# Patient Record
Sex: MALE | Race: WHITE | HISPANIC OR LATINO | Employment: FULL TIME | URBAN - METROPOLITAN AREA
[De-identification: names, ages, dates, MRNs, and addresses within clinical notes are randomized per-mention and may not be internally consistent; named-entity substitution may affect disease eponyms.]

---

## 2017-10-27 ENCOUNTER — HISTORICAL (OUTPATIENT)
Dept: SURGERY | Facility: HOSPITAL | Age: 37
End: 2017-10-27

## 2018-04-11 ENCOUNTER — HISTORICAL (OUTPATIENT)
Dept: LAB | Facility: HOSPITAL | Age: 38
End: 2018-04-11

## 2018-04-11 ENCOUNTER — HISTORICAL (OUTPATIENT)
Dept: PREADMISSION TESTING | Facility: HOSPITAL | Age: 38
End: 2018-04-11

## 2018-04-11 LAB
ABS NEUT (OLG): 3.69 X10(3)/MCL (ref 2.1–9.2)
ALBUMIN SERPL-MCNC: 4.3 GM/DL (ref 3.4–5)
ALBUMIN/GLOB SERPL: 1.4 RATIO (ref 1.1–2)
ALP SERPL-CCNC: 67 UNIT/L (ref 50–136)
ALT SERPL-CCNC: 57 UNIT/L (ref 12–78)
APTT PPP: 29.8 SECOND(S) (ref 24.8–36.9)
AST SERPL-CCNC: 29 UNIT/L (ref 15–37)
BASOPHILS # BLD AUTO: 0.1 X10(3)/MCL (ref 0–0.2)
BASOPHILS NFR BLD AUTO: 1 %
BILIRUB SERPL-MCNC: 0.8 MG/DL (ref 0.2–1)
BILIRUBIN DIRECT+TOT PNL SERPL-MCNC: 0.1 MG/DL (ref 0–0.5)
BILIRUBIN DIRECT+TOT PNL SERPL-MCNC: 0.7 MG/DL (ref 0–0.8)
BUN SERPL-MCNC: 16 MG/DL (ref 7–18)
CALCIUM SERPL-MCNC: 9.9 MG/DL (ref 8.5–10.1)
CHLORIDE SERPL-SCNC: 107 MMOL/L (ref 98–107)
CO2 SERPL-SCNC: 27 MMOL/L (ref 21–32)
CREAT SERPL-MCNC: 0.9 MG/DL (ref 0.7–1.3)
EOSINOPHIL # BLD AUTO: 0.2 X10(3)/MCL (ref 0–0.9)
EOSINOPHIL NFR BLD AUTO: 3 %
ERYTHROCYTE [DISTWIDTH] IN BLOOD BY AUTOMATED COUNT: 12.8 % (ref 11.5–17)
GLOBULIN SER-MCNC: 3.1 GM/DL (ref 2.4–3.5)
GLUCOSE SERPL-MCNC: 94 MG/DL (ref 74–106)
HCT VFR BLD AUTO: 47.1 % (ref 42–52)
HGB BLD-MCNC: 15.6 GM/DL (ref 14–18)
INR PPP: 0.99 (ref 0–1.27)
LYMPHOCYTES # BLD AUTO: 1.4 X10(3)/MCL (ref 0.6–4.6)
LYMPHOCYTES NFR BLD AUTO: 25 %
MCH RBC QN AUTO: 30 PG (ref 27–31)
MCHC RBC AUTO-ENTMCNC: 33.1 GM/DL (ref 33–36)
MCV RBC AUTO: 90.6 FL (ref 80–94)
MONOCYTES # BLD AUTO: 0.4 X10(3)/MCL (ref 0.1–1.3)
MONOCYTES NFR BLD AUTO: 6 %
NEUTROPHILS # BLD AUTO: 3.69 X10(3)/MCL (ref 1.4–7.9)
NEUTROPHILS NFR BLD AUTO: 63 %
PLATELET # BLD AUTO: 251 X10(3)/MCL (ref 130–400)
PMV BLD AUTO: 11.4 FL (ref 9.4–12.4)
POTASSIUM SERPL-SCNC: 5 MMOL/L (ref 3.5–5.1)
PROT SERPL-MCNC: 7.4 GM/DL (ref 6.4–8.2)
PROTHROMBIN TIME: 13.4 SECOND(S) (ref 12.2–14.7)
RBC # BLD AUTO: 5.2 X10(6)/MCL (ref 4.7–6.1)
SODIUM SERPL-SCNC: 140 MMOL/L (ref 136–145)
WBC # SPEC AUTO: 5.8 X10(3)/MCL (ref 4.5–11.5)

## 2018-04-20 ENCOUNTER — HISTORICAL (OUTPATIENT)
Dept: SURGERY | Facility: HOSPITAL | Age: 38
End: 2018-04-20

## 2018-04-30 ENCOUNTER — HISTORICAL (OUTPATIENT)
Dept: ADMINISTRATIVE | Facility: HOSPITAL | Age: 38
End: 2018-04-30

## 2018-04-30 LAB
ABS NEUT (OLG): 3.8
ALBUMIN SERPL-MCNC: 4.3 GM/DL (ref 3.4–5)
ALBUMIN/GLOB SERPL: 2.05 {RATIO} (ref 1.5–2.5)
ALP SERPL-CCNC: 60 UNIT/L (ref 38–126)
ALT SERPL-CCNC: 33 UNIT/L (ref 7–52)
AST SERPL-CCNC: 21 UNIT/L (ref 15–37)
BILIRUB SERPL-MCNC: 0.5 MG/DL (ref 0.2–1)
BILIRUBIN DIRECT+TOT PNL SERPL-MCNC: 0.1 MG/DL (ref 0–0.5)
BUN SERPL-MCNC: 12 MG/DL (ref 7–18)
CALCIUM SERPL-MCNC: 9.1 MG/DL (ref 8.5–10)
CHLORIDE SERPL-SCNC: 106 MMOL/L (ref 98–107)
CHOLEST SERPL-MCNC: 243 MG/DL (ref 0–200)
CHOLEST/HDLC SERPL: 6.4 {RATIO}
CO2 SERPL-SCNC: 26 MMOL/L (ref 21–32)
CREAT SERPL-MCNC: 0.88 MG/DL (ref 0.6–1.3)
CREAT/UREA NIT SERPL: 13.6
ERYTHROCYTE [DISTWIDTH] IN BLOOD BY AUTOMATED COUNT: 13.1 % (ref 11.5–17)
GGT SERPL-CCNC: 30 UNIT/L (ref 5–85)
GLOBULIN SER-MCNC: 2.1 GM/DL (ref 1.2–3)
GLUCOSE SERPL-MCNC: 104 MG/DL (ref 74–106)
HCT VFR BLD AUTO: 43.9 % (ref 42–52)
HDLC SERPL-MCNC: 38 MG/DL (ref 35–60)
HGB BLD-MCNC: 14.7 GM/DL (ref 14–18)
LDH SERPL-CCNC: 155 UNIT/L (ref 140–271)
LDLC SERPL CALC-MCNC: 162 MG/DL (ref 0–129)
LYMPHOCYTES # BLD AUTO: 1.7 X10(3)/MCL (ref 0.6–3.4)
LYMPHOCYTES NFR BLD AUTO: 28.1 % (ref 13–40)
MCH RBC QN AUTO: 31 PG (ref 27–31.2)
MCHC RBC AUTO-ENTMCNC: 34 GM/DL (ref 32–36)
MCV RBC AUTO: 93 FL (ref 80–94)
MONOCYTES # BLD AUTO: 0.4 X10(3)/MCL (ref 0–1.8)
MONOCYTES NFR BLD AUTO: 7.4 % (ref 0.1–24)
NEUTROPHILS NFR BLD AUTO: 64.5 % (ref 47–80)
PLATELET # BLD AUTO: 281 X10(3)/MCL (ref 130–400)
PMV BLD AUTO: 10.1 FL
POTASSIUM SERPL-SCNC: 4.3 MMOL/L (ref 3.5–5.1)
PROT SERPL-MCNC: 6.4 GM/DL (ref 6.4–8.2)
PSA SERPL-MCNC: 0.73 NG/ML (ref 0–3.5)
RBC # BLD AUTO: 4.74 X10(6)/MCL (ref 4.7–6.1)
SODIUM SERPL-SCNC: 139 MMOL/L (ref 136–145)
TRIGL SERPL-MCNC: 176 MG/DL (ref 30–150)
VLDLC SERPL CALC-MCNC: 35.2 MG/DL
WBC # SPEC AUTO: 5.9 X10(3)/MCL (ref 4.5–11.5)

## 2020-02-20 ENCOUNTER — HISTORICAL (OUTPATIENT)
Dept: ADMINISTRATIVE | Facility: HOSPITAL | Age: 40
End: 2020-02-20

## 2020-02-20 ENCOUNTER — HISTORICAL (OUTPATIENT)
Dept: LAB | Facility: HOSPITAL | Age: 40
End: 2020-02-20

## 2020-02-20 LAB
ABS NEUT (OLG): 4 X10(3)/MCL (ref 2.1–9.2)
ALBUMIN SERPL-MCNC: 5.2 GM/DL (ref 3.4–5)
ALBUMIN/GLOB SERPL: 2.17 {RATIO} (ref 1.5–2.5)
ALP SERPL-CCNC: 61 UNIT/L (ref 38–126)
ALT SERPL-CCNC: 31 UNIT/L (ref 7–52)
AST SERPL-CCNC: 28 UNIT/L (ref 15–37)
BILIRUB SERPL-MCNC: 1.4 MG/DL (ref 0.2–1)
BILIRUBIN DIRECT+TOT PNL SERPL-MCNC: 0.2 MG/DL (ref 0–0.5)
BILIRUBIN DIRECT+TOT PNL SERPL-MCNC: 1.2 MG/DL
BUN SERPL-MCNC: 17 MG/DL (ref 7–18)
C DIFF INTERP: NEGATIVE
CALCIUM SERPL-MCNC: 10.1 MG/DL (ref 8.5–10)
CHLORIDE SERPL-SCNC: 101 MMOL/L (ref 98–107)
CO2 SERPL-SCNC: 27 MMOL/L (ref 21–32)
CREAT SERPL-MCNC: 1.01 MG/DL (ref 0.6–1.3)
ERYTHROCYTE [DISTWIDTH] IN BLOOD BY AUTOMATED COUNT: 12.4 % (ref 11.5–17)
FLUAV AG NPH QL IA: NEGATIVE
FLUBV AG NPH QL IA: NEGATIVE
GLOBULIN SER-MCNC: 2.4 GM/DL (ref 1.2–3)
GLUCOSE SERPL-MCNC: 112 MG/DL (ref 74–106)
HCT VFR BLD AUTO: 50.6 % (ref 42–52)
HGB BLD-MCNC: 16.8 GM/DL (ref 14–18)
LYMPHOCYTES # BLD AUTO: 1.1 X10(3)/MCL (ref 0.6–3.4)
LYMPHOCYTES NFR BLD AUTO: 18.5 % (ref 13–40)
MCH RBC QN AUTO: 29.2 PG (ref 27–31.2)
MCHC RBC AUTO-ENTMCNC: 33 GM/DL (ref 32–36)
MCV RBC AUTO: 88 FL (ref 80–94)
MONOCYTES # BLD AUTO: 0.9 X10(3)/MCL (ref 0.1–1.3)
MONOCYTES NFR BLD AUTO: 15.3 % (ref 0.1–24)
NEUTROPHILS NFR BLD AUTO: 66.2 % (ref 47–80)
PLATELET # BLD AUTO: 245 X10(3)/MCL (ref 130–400)
PMV BLD AUTO: 10.6 FL (ref 9.4–12.4)
POTASSIUM SERPL-SCNC: 6.4 MMOL/L (ref 3.5–5.1)
PROT SERPL-MCNC: 7.6 GM/DL (ref 6.4–8.2)
RBC # BLD AUTO: 5.76 X10(6)/MCL (ref 4.7–6.1)
SODIUM SERPL-SCNC: 137 MMOL/L (ref 136–145)
WBC # SPEC AUTO: 6 X10(3)/MCL (ref 4.5–11.5)

## 2020-02-21 ENCOUNTER — HISTORICAL (OUTPATIENT)
Dept: ADMINISTRATIVE | Facility: HOSPITAL | Age: 40
End: 2020-02-21

## 2020-02-21 LAB
BUN SERPL-MCNC: 19 MG/DL (ref 7–18)
CALCIUM SERPL-MCNC: 9.9 MG/DL (ref 8.5–10)
CHLORIDE SERPL-SCNC: 100 MMOL/L (ref 98–107)
CO2 SERPL-SCNC: 26 MMOL/L (ref 21–32)
CREAT SERPL-MCNC: 0.99 MG/DL (ref 0.6–1.3)
CREAT/UREA NIT SERPL: 19.2
GLUCOSE SERPL-MCNC: 163 MG/DL (ref 74–106)
POTASSIUM SERPL-SCNC: 4.1 MMOL/L (ref 3.5–5.1)
SODIUM SERPL-SCNC: 136 MMOL/L (ref 136–145)

## 2020-02-22 LAB — FINAL CULTURE: NORMAL

## 2021-11-19 ENCOUNTER — HISTORICAL (OUTPATIENT)
Dept: ADMINISTRATIVE | Facility: HOSPITAL | Age: 41
End: 2021-11-19

## 2022-04-10 ENCOUNTER — HISTORICAL (OUTPATIENT)
Dept: ADMINISTRATIVE | Facility: HOSPITAL | Age: 42
End: 2022-04-10

## 2022-04-30 VITALS
WEIGHT: 224 LBS | SYSTOLIC BLOOD PRESSURE: 122 MMHG | HEIGHT: 74 IN | BODY MASS INDEX: 29 KG/M2 | BODY MASS INDEX: 28.75 KG/M2 | DIASTOLIC BLOOD PRESSURE: 80 MMHG | HEIGHT: 74 IN | DIASTOLIC BLOOD PRESSURE: 78 MMHG | WEIGHT: 226 LBS | SYSTOLIC BLOOD PRESSURE: 123 MMHG

## 2022-04-30 NOTE — OP NOTE
DATE OF SURGERY:    10/27/2017    SURGEON:  Gelacio Mobley MD    PREOPERATIVE DIAGNOSIS:  Bleeding internal hemorrhoids.    POSTOPERATIVE DIAGNOSIS:  Bleeding internal hemorrhoids.    PROCEDURE:  Excisional hemorrhoidectomy times two.    ANESTHESIA:  Regional.    ESTIMATED BLOOD LOSS:  30 ml.    SPECIMENS:  Hemorrhoids.    COMPLICATIONS:  None.    PROCEDURE IN DETAIL:  After informed consent was obtained, the patient was brought to the Operating Room.  A member of the anesthesia team placed a spinal without complication.  The patient was then placed in prone jackknife position.  Buttocks were taped apart for exposure.  The perianal skin was prepped and draped in sterile surgical fashion.  A medium Hill-Cristina retractor was inserted and all four quadrants inspected.  The patient had enlarged left lateral and right posterolateral hemorrhoidal columns.  Left hemorrhoidal column was excised first.  Marcaine 0.25% with epinephrine was used for local anesthesia.  The anoderm was incised sharply with a #15 blade.  Hemorrhoidal column was carefully dissected off of the underlying sphincter complex with Metzenbaum scissors.  Excision was completed with electrocautery.  Hemostasis was achieved with spot cautery.  Anoderm was reapproximated with running locking 2-0 chromic suture.  Next, the right posterolateral hemorrhoidal column was excised similarly.  Upon completion, both suture lines were hemostatic.  No other pathology was identified.  A hydrocortisone suppository was placed within the rectum.  Recticare cream was applied externally, covered with fluff     gauze and secured with tape.  The patient tolerated the procedure well.  There were no complications.  He was returned to the supine position then subsequently transferred to recovery in satisfactory condition.        ______________________________  MD CAMILLE Jones/CIELO  DD:  10/27/2017  Time:  01:36PM  DT:  10/29/2017  Time:  01:00PM  Job #:   [id number]    cc: Jarrett Bray MD

## 2022-04-30 NOTE — OP NOTE
DATE OF SURGERY:    04/20/2018    SURGEON:  Jose Rodriguez MD    PREOPERATIVE DIAGNOSIS:  Nasal obstruction; deviated nasal septum; hypertrophy bilateral inferior turbinates.    PROCEDURE:  Septoplasty; submucosal resection bilateral inferior turbinates.    ESTIMATED BLOOD LOSS:  Normal.    INDICATIONS FOR PROCEDURE:  This is a pleasant male with a longstanding history of nasal obstruction refractory to nasal steroids and conservative management.    PROCEDURE IN DETAIL:  Septoplasty was performed by making a hemitransfixion incision on the patient's left side. A mucoperichondrial flap was then elevated. The deviated components of the nasal septum were removed, leaving 1.5 cm dorsal and caudal struts to aid in the support. The hemitransfixion incision was then closed with a chromic suture and then reapproximated with a running 5-0 plain gut suture.    Bilateral submucosal resection of the inferior turbinates was completed by addressing the inferior turbinates with a shaver microdebrider, removing the submucosal tissue and then outfractured laterally with a Bautista elevator.    NasoPore was placed in the bilateral middle meatus with hemostasis. The patient was then turned over to Anesthesia for extubation.  Patient tolerated the procedure well without complication.            ______________________________  MD JERRI Lee/AGUSTO  DD:  05/02/2018  Time:  05:04PM  DT:  05/03/2018  Time:  07:23AM  Job #:  [id number]

## 2022-05-02 NOTE — HISTORICAL [facility] CERNER
This is a historical note converted from Larry. Formatting and pictures may have been removed.  Please reference Larry for original formatting and attached multimedia. Chief Complaint  R foot ?pain/ eval mole on back  History of Present Illness  The patient is a 41-year-old white male who?reports?trauma to the right heel?several weeks ago.? He?complains of continued dull ache?3-4 out of 10 when walking.? No paresthesias. ?No weakness.? Taking?ibuprofen as needed. ?Icing it.? No progression but no improvement.  He reports right flank lesion on his back times several years-recently?traumatized it by accidentally scratching and it bled a bit. ?No pain.? No discharge.? Would like evaluated?and removed or?destroyed if possible.  Review of Systems  Constitutional_no fever chills,?no unintentional weight loss  Eye_  ENMT_  Respiratory_no shortness of breath or cough  Cardiovascular_no chest pain?or shortness of breath  Gastrointestinal_  Genitourinary_  Hema/Lymph_  Endocrine_  Immunologic_  Musculoskeletal_as per HPI  Integumentary_as per HPI  Neurologic_  All Other ROS_negative  Physical Exam  Vitals & Measurements  BP:?122/78?  HT:?187.00?cm? WT:?101.600?kg? BMI:?29.05?  VITAL SIGNS:? Reviewed.? ?  GENERAL:? In?no apparent distress.? Alert and Oriented x3  CHEST:? Chest with clear breath sounds bilaterally.??No wheezes, rales, or rhonchi. Good air movement  CARDIAC:??Regular rate and rhythm.? S1 and S2,?without murmurs, gallops, or rubs.  ABDOMINAL: Normal active BS X all 4 quadrants. Nontender. Nondistended.  NEUROLOGIC EXAM:? Alert and oriented x 3.? No focal sensory or strength deficits.? ?Speech normal.? Follows commands.  MUSCULOSKELATAL: Full range of motion.? 5 out of 5 strength throughout.? Right?heel TTP near insertion of?plantar fascia on calcaneus-no ecchymoses or abrasion or induration or erythema.  SKIN: 5 x 5 mm verrucous?flat wart right flank. ?Mildly traumatized. ?Hemostatic currently.  PSYCHIATRIC:?  Mood normal.  ?  ?  Assessment/Plan  1.?Pain of right heel?M79.671  ?-X-ray performed right heel-no fracture dislocation or bone spur  Ordered:  Clinic Follow-up PRN, 11/19/21 8:29:00 CST, [facility] - [facility], Future Order  Office/Outpatient Visit Level 4 Established 86665 PC, Pain of right heel  Flu vaccine need  [place], [facility], 11/19/21 8:29:00 CST  ?  2.?Contusion of right heel?S90.31XA  ?-Correlates with?contusion and likely?bursitis of?heel  -Documentation given for contusion for education  -Heat and rehab denies-use ice frozen water bottle?rolling over area  -Ibuprofen or Tylenol as needed  -OTC Biofreeze  ?  3.?Wart?B07.9  ?-Discussion of different treatments discussed and cryotherapy was agreed upon with patient  -Follow-up if not improving for further evaluation and possible repeat cryotherapy  ?  ?Procedure Note:?Right flank wart cryotherapy  ?   Informed consent was?obtained.? The patient was placed in the appropriate anatomic position.? The area was marked, draped, and prepped to created a sterile environment.???  3?sequential?cryotherapy applications-patient?tolerated well and post cryotherapy education for wound care given  The patient tolerated the procedure well.? The patient was hemostatic post procedure.? Skin care directions were given.  ?  ?  Ordered:  Lesion 1 Benign/Prelim - Destruction [id number] PC, 11/19/21 8:29:00 CST, [facility], 11/19/21 8:29:00 CST, Wart  Office/Outpatient Visit Level 4 Established 71821 PC, Pain of right heel  Flu vaccine need  Wart, [facility], 11/19/21 8:29:00 CST  ?  4.?Flu vaccine need?Z23  ?-Regular dose flu vaccine  Ordered:  Office/Outpatient Visit Level 4 Established 55017 PC, Pain of right heel  Flu vaccine need  Wart, [facility], 11/19/21 8:29:00 CST  ?  Orders:  XR Heel Calcaneus Right 2 Views, Routine, 11/19/21 8:35:00 CST, injury, None, Ambulatory, Rad Type, Not Scheduled, 11/19/21 8:35:00 CST  Referrals  Clinic Follow-up PRN, 11/19/21  8:29:00 CST, [facility] - [facility], Future Order   Problem List/Past Medical History  Ongoing  Allergic asthma  Chronic infection of sinus  Hemorrhoid  History of multiple allergies  Obesity  Wears glasses  Historical  No qualifying data  Procedure/Surgical History  Excision of Nasal Septum, Percutaneous Endoscopic Approach (04/20/2018)  Resection of Nasal Turbinate, Percutaneous Endoscopic Approach (04/20/2018)  Septoplasty (Bilateral) (04/20/2018)  Septoplasty or submucous resection, with or without cartilage scoring, contouring or replacement with graft (04/20/2018)  Submucous resection inferior turbinate, partial or complete, any method (04/20/2018)  Turbinectomy (Bilateral) (04/20/2018)  Excision of Anus, External Approach (10/27/2017)  Excision of multiple external papillae or tags, anus (10/27/2017)  Hemorrhoidectomy (None) (10/27/2017)  Colonoscopy  LESI   Medications  AUVI Q 0.3 MG AUTO INJECTOR  CETIRIZINE 10MG TABLETS, 10 mg= 1 tab(s), Oral, Daily  Allergies  No Known Medication Allergies  Rye?(sinus allergies, gas)  Wheat?(sinus allergies, gas)  Social History  Alcohol  Current, Wine, 1-2 times per week, 10/09/2017  Substance Use - Denies Substance Abuse, 10/27/2017  Tobacco  Never smoker, Household tobacco concerns: No., 12/21/2016  Family History  Colitis: Mother.  Diabetes mellitus type 2: Father.  Hyperlipidemia.: Father.  Hypertension.: Father.  Schizoaffective disorder: Brother.  Immunizations  Vaccine Date Status   influenza virus vaccine, inactivated 11/19/2021 Given   COVID-19 MRNA, LNP-S, PF- Pfizer 03/30/2021 Given   COVID-19 MRNA, LNP-S, PF- Pfizer 03/09/2021 Given   influenza virus vaccine, inactivated 10/19/2020 Given   influenza virus vaccine, inactivated 11/15/2019 Given   tetanus/diphtheria/pertussis, acel(Tdap) 11/15/2019 Given   tetanus/diphth/pertuss (Tdap) adult/adol 2009 Recorded   Health Maintenance  Health Maintenance  ???Pending?(in the next year)  ??? ??OverDue  ??? ? ? ?Alcohol  Misuse Screening due??01/02/21??and every 1??year(s)  ??? ??Due?  ??? ? ? ?ADL Screening due??11/19/21??and every 1??year(s)  ??? ? ? ?Asthma Management-Asthma Education due??11/19/21??and every 6??month(s)  ??? ? ? ?Asthma Management-Spirometry due??11/19/21??Variable frequency  ??? ? ? ?Asthma Management-Phillips Peak Flow due??11/19/21??Variable frequency  ??? ? ? ?Asthma Management-Written Action Plan due??11/19/21??and every 6??month(s)  ??? ? ? ?Depression Screening due??11/19/21??Unknown Frequency  ??? ??Due In Future?  ??? ? ? ?Obesity Screening not due until??01/01/22??and every 1??year(s)  ???Satisfied?(in the past 1 year)  ??? ??Satisfied?  ??? ? ? ?Blood Pressure Screening on??11/19/21.??Satisfied by Kathy Mcdaniel MA  ??? ? ? ?Body Mass Index Check on??11/19/21.??Satisfied by Kathy Mcdaniel MA  ??? ? ? ?Influenza Vaccine on??11/19/21.??Satisfied by Kathy Mcdaniel MA  ??? ? ? ?Obesity Screening on??11/19/21.??Satisfied by Kathy Mcdaniel MA  ?

## 2022-05-02 NOTE — HISTORICAL [facility] CERNER
This is a historical note converted from [place]. Formatting and pictures may have been removed.  Please reference Larry for original formatting and attached multimedia. Chief Complaint  sinus pressure/ diarrhea  History of Present Illness  The patient is a?39 year old?male who presents today with symptoms of cough, cold and congestion. duration of symptoms is off and on x 2 weeks. Associated symptoms include - sore throat, nasal congestion, rhinorrhea, sinus pressure, and productive/non-productive cough. Prior to treatment, over the counter meds have been with?nothing at present. The patient reports?+ fever and chills yesterday. The patient reports?+ sick contacts.- wife and kid. States symptoms subsided and returned 3 days ago  States symptoms started last week for a few days then subsided and now has returned, however for the past week he has also had diarrhea- states bad enough that he has not made it to the restroom in time- several times a day, states so far this am has gone 5 times- appetite has decreased, however is still eating at least 2 meals a day. States very foul smelling, very loose and watery with some mucous per patient. No recent abx use.  Review of Systems  Constitutional:?+ fever/chills last night, no weakness?  Eye:?no eye redness, drainage, or pain  ENMT:?sore?throat pain, postnasal drainage, mucus production, sinus pressure and congestion off and on  Respiratory:?no wheezing,?no shortness of breath  Cardiovascular:?no chest pain  Gastrointestinal:?no nausea, vomiting,?+ diarrhea x 1 week,?No abdominal pain- states sore  ?  Physical Exam  Vitals & Measurements  BP:?123/80?  BMI:?29.31?  Ears-TMs and EAC clear  Nose-Mucosa Erythematous,Purulent discharge noted,+ Sinus Pressure with + turbinates  O/P-No erythemaor exudatesnoted  Neck- S,No LAnoted  CV- RRR W/O MGR, Pulses equal throughout  Respiratory-CTA,No Wheezing,No Crackles,No Rhonchi  Gastrointestinal- S, slight tenderness- generalized, no  rebound or guarding, No HSM, NABS, No masses, No peritoneal signs?  Assessment/Plan  1.?Sinusitis?J32.9  1. Celestone 6mg IM x?2 now  2. Mucinex OTC as directed with an increase in H20 intake  3.? Saline rinses prn  4. Tylenol/Ibuprofen OTC prn  5. Follow up office visit if no relief  6. Flu (-)  Ordered:  betamethasone, 12 mg, IM, Once-Unscheduled, first dose 02/20/20 9:54:00 CST  Office/Outpatient Visit Level 3 Established 72399 PC, Sinusitis  Diarrhea, [facility], 02/20/20 9:54:00 CST  ?  2.?Diarrhea?R19.7,?Diarrhea?R19.7  1. Stool collected for C diff/Giardia/Crypto, C&S- CBC reviewed at time of visit, CMP pending  2. Will start Cipro 500mg po bid 10 days as directed as specimen was collected today  3. Pepto as directed, may turn stools black - if remain once stop will need to notify office- verbalized understanding  Ordered:  Clostridium Difficile by PCR, Routine collect, 02/20/20 9:28:00 CST, Stool Clostrium difficile, Order for future visit, Stop date 02/20/20 9:28:00 CST, Nurse collect, Diarrhea, 02/20/20 9:28:00 CST  Comprehensive Metabolic Panel, Routine collect, 02/20/20 9:31:00 CST, Blood, Stop date 02/20/20 9:31:00 CST, Lab Collect, Fever  Diarrhea, 02/20/20 9:31:00 CST  Giardia/Cryptosporidium Antigen:, Routine collect, 02/20/20 9:28:00 CST, Stool, Order for future visit, Stop date 02/20/20 9:28:00 CST, Nurse collect, Diarrhea, 02/20/20 9:28:00 CST  Office/Outpatient Visit Level 3 Established 17319 PC, Sinusitis  Diarrhea, [facility] - [facility], 02/20/20 9:54:00 CST  Stool Culture, Routine collect, 02/20/20 9:28:00 CST, Order for future visit, Stool, Stop date 02/20/20 9:28:00 CST, Diarrhea  ?  Orders:  ciprofloxacin, 500 mg = 1 tab(s), Oral, q12hr, X 10 day(s), # 20 tab(s), 0 Refill(s), Pharmacy: [id number], 187, cm, Height/Length Dosing, 04/30/18 11:12:00 CDT, 101, kg, Weight Dosing, 04/30/18 11:12:00 CDT  Clinic Follow-up PRN, 02/20/20 9:54:00 AYO, [facility] - [facility], Future  Order  Referrals  Clinic Follow-up PRN, 02/20/20 9:54:00 CST, [facility] - [facility], Future Order   Problem List/Past Medical History  Ongoing  Allergic asthma  Chronic infection of sinus  Hemorrhoid  History of multiple allergies  Obesity  Wears glasses  Historical  No qualifying data  Procedure/Surgical History  Excision of Nasal Septum, Percutaneous Endoscopic Approach (04/20/2018)  Resection of Nasal Turbinate, Percutaneous Endoscopic Approach (04/20/2018)  Septoplasty (Bilateral) (04/20/2018)  Septoplasty or submucous resection, with or without cartilage scoring, contouring or replacement with graft (04/20/2018)  Submucous resection inferior turbinate, partial or complete, any method (04/20/2018)  Turbinectomy (Bilateral) (04/20/2018)  Excision of Anus, External Approach (10/27/2017)  Excision of multiple external papillae or tags, anus (10/27/2017)  Hemorrhoidectomy (None) (10/27/2017)  Colonoscopy  LESI   Medications  albuterol 90 mcg/inh inhalation aerosol, 1 puff(s), INH, As Directed, PRN  AUVI Q 0.3 MG AUTO INJECTOR  CETIRIZINE 10MG TABLETS, 10 mg= 1 tab(s), Oral, Daily  Cipro 500 mg oral tablet, 500 mg= 1 tab(s), Oral, q12hr  CYCLOBENZAPRINE 10MG TABLETS, 10 mg= 1 tab(s), Oral, q8hr  GABAPENTIN 300MG CAPSULES, 300 mg= 1 cap(s), Oral, TID  Allergies  No Known Medication Allergies  Rye?(sinus allergies, gas)  Wheat?(sinus allergies, gas)  Social History  Alcohol  Current, Wine, 1-2 times per week, 10/09/2017  Substance Use - Denies Substance Abuse, 10/27/2017  Tobacco  Never smoker, Household tobacco concerns: No., 12/21/2016  Family History  Colitis: Mother.  Diabetes mellitus type 2: Father.  Hyperlipidemia.: Father.  Hypertension.: Father.  Schizoaffective disorder: Brother.  Immunizations  Vaccine Date Status   influenza virus vaccine, inactivated 11/15/2019 Given   tetanus/diphtheria/pertussis, acel(Tdap) 11/15/2019 Given   tetanus/diphth/pertuss (Tdap) adult/adol 2009 Recorded   Health Maintenance  Health  Maintenance  ???Pending?(in the next year)  ??? ??Due?  ??? ? ? ?Alcohol Misuse Screening due??01/01/20??and every 1??year(s)  ??? ? ? ?ADL Screening due??02/20/20??and every 1??year(s)  ??? ? ? ?Asthma Management-Asthma Education due??02/20/20??and every 6??month(s)  ??? ? ? ?Asthma Management-Spirometry due??02/20/20??Variable frequency  ??? ? ? ?Asthma Management-Ph[place] Peak Flow due??02/20/20??Variable frequency  ??? ? ? ?Asthma Management-Written Action Plan due??02/20/20??and every 6??month(s)  ??? ? ? ?Depression Screening due??02/20/20??and every?  ??? ??Due In Future?  ??? ? ? ?Obesity Screening not due until??01/01/21??and every 1??year(s)  ??? ? ? ?Blood Pressure Screening not due until??02/19/21??and every 1??year(s)  ??? ? ? ?Body Mass Index Check not due until??02/19/21??and every 1??year(s)  ???Satisfied?(in the past 1 year)  ??? ??Satisfied?  ??? ? ? ?Blood Pressure Screening on??02/20/20.??Satisfied by Kathy Mcdaniel MA  ??? ? ? ?Body Mass Index Check on??02/20/20.??Satisfied by Brittanylekelin TUCKER Kathy  ??? ? ? ?Influenza Vaccine on??11/15/19.??Satisfied by Lelekelin MA Kathy  ??? ? ? ?Obesity Screening on??02/20/20.??Satisfied by Lelekelin MA Kathy  ??? ? ? ?Tetanus Vaccine on??11/15/19.??Satisfied by Leleux MA Kathy  ?      The?physician?is present within?the office with the?nurse practitioner.??The?office visit?documentation and management have been?reviewed and agreed upon.? I will continue to follow?this patient along with?the nurse practitioner?for current and future care.

## 2022-05-26 PROBLEM — J45.909 ASTHMA: Status: ACTIVE | Noted: 2022-05-26

## 2022-05-26 PROBLEM — R06.02 SOB (SHORTNESS OF BREATH): Status: ACTIVE | Noted: 2022-05-26

## 2022-05-26 PROBLEM — U07.1 COVID: Status: ACTIVE | Noted: 2022-05-26

## 2023-01-20 PROBLEM — E66.9 OBESITY: Status: ACTIVE | Noted: 2023-01-20

## 2023-01-31 PROBLEM — R06.02 SOB (SHORTNESS OF BREATH): Status: RESOLVED | Noted: 2022-05-26 | Resolved: 2023-01-31

## 2023-01-31 PROBLEM — U07.1 COVID: Status: RESOLVED | Noted: 2022-05-26 | Resolved: 2023-01-31

## 2023-03-01 PROBLEM — M54.50 LUMBAR BACK PAIN: Status: ACTIVE | Noted: 2023-03-01

## 2023-09-26 ENCOUNTER — OFFICE VISIT (OUTPATIENT)
Dept: URGENT CARE | Facility: CLINIC | Age: 43
End: 2023-09-26
Payer: COMMERCIAL

## 2023-09-26 VITALS
HEART RATE: 111 BPM | SYSTOLIC BLOOD PRESSURE: 135 MMHG | BODY MASS INDEX: 28.88 KG/M2 | DIASTOLIC BLOOD PRESSURE: 103 MMHG | OXYGEN SATURATION: 97 % | RESPIRATION RATE: 16 BRPM | WEIGHT: 225 LBS | HEIGHT: 74 IN | TEMPERATURE: 99 F

## 2023-09-26 DIAGNOSIS — R21 RASH, SKIN: ICD-10-CM

## 2023-09-26 DIAGNOSIS — S51.812A LACERATION OF SKIN OF LEFT FOREARM, INITIAL ENCOUNTER: Primary | ICD-10-CM

## 2023-09-26 DIAGNOSIS — R03.0 ELEVATED BP WITHOUT DIAGNOSIS OF HYPERTENSION: ICD-10-CM

## 2023-09-26 PROCEDURE — 99214 OFFICE O/P EST MOD 30 MIN: CPT | Mod: ,,, | Performed by: FAMILY MEDICINE

## 2023-09-26 PROCEDURE — 99214 PR OFFICE/OUTPT VISIT, EST, LEVL IV, 30-39 MIN: ICD-10-PCS | Mod: ,,, | Performed by: FAMILY MEDICINE

## 2023-09-26 RX ORDER — KETOCONAZOLE 20 MG/G
CREAM TOPICAL DAILY
Qty: 15 G | Refills: 0 | Status: SHIPPED | OUTPATIENT
Start: 2023-09-26 | End: 2023-12-27 | Stop reason: SDUPTHER

## 2023-09-26 RX ORDER — CEPHALEXIN 500 MG/1
500 CAPSULE ORAL EVERY 8 HOURS
Qty: 15 CAPSULE | Refills: 0 | Status: SHIPPED | OUTPATIENT
Start: 2023-09-26 | End: 2023-10-01

## 2023-09-26 NOTE — PROGRESS NOTES
"Subjective:      Patient ID: Khai Guerra is a 43 y.o. male.    Vitals:  height is 6' 2" (1.88 m) and weight is 102.1 kg (225 lb). His temperature is 98.9 °F (37.2 °C). His blood pressure is 135/103 (abnormal) and his pulse is 111 (abnormal). His respiration is 16 and oxygen saturation is 97%.     Chief Complaint: Laceration (Laceration to left forearm about 15 mins ago. Pulled on an item, edge of nail punctured patient skin. Tdap 2019)    HPI:  43-year-old male present to clinic with concerns of laceration left forearm 15 minutes prior coming to the clinic.  States was working on a limping project at home, accidentally got stuck with edge of the nail.  Denies tingling numbness or weakness to the forearm hand or fingers.  No pain.  Last tetanus immunization 2019.   Patient also requesting for ketoconazole topical cream, states out of medication.  Uses the medication when he develops rash on the face with humidity.  With concerns of possible fungal infection.  No rash currently    ROS :  Constitutional : No Fever, No  Chills  Neck : Negative except HPI  Respiratory : Negative for shortness of breath and wheezing  Cardiovascular : Negative for chest pain, No palpitations  Gastrointestinal : No nausea, No vomiting, No abdominal pain  Muskeloskeletal : Negative except HPI  Integumentary : As Per HPI  Neurological : No tingling, No numbness, No weakness   Objective:     Physical Exam  General : Alert and Oriented, No apparent distress, afebrile  Neck - supple  HENT : Oropharynx no redness or swelling.    Respiratory : Bilateral equal breath sounds, nonlabored respirations  Cardiovascular : Rate, rhythm regular, normal volume pulse, no murmur  Musculoskeletal:  Left forearm, mid dorsum, superficial 1 cm skin flap, appears more like skin tear.  No active bleeding.  Nontender to palpate.  No concerns of visible foreign body  Integumentary : Warm, Dry, face no rash    Informed verbal consent by patient, skin " approximation using Steri-Strips to promote healing and to avoid infection.  Patient voiced understanding and consented.  Clean the area thoroughly with peroxide and Hibiclens, no concerns of visible foreign body.  Tincture iodine to reinforce the Steri-Strips around the skin tear.  Under sterile precaution with possible approximation of skin topical Steri-Strips placed.  Pressure dressing    Assessment:     1. Laceration of skin of left forearm, initial encounter    2. Rash, skin    3. Elevated BP without diagnosis of hypertension      Plan:   Discussed in detail on physical findings, repair with Steri-Strips.  Keep the area dry and clean.  Do not soak in water like bathtub or swimming pool.  Tylenol or ibuprofen for pain and discomfort.  Considering the rusted nail antibiotics to prevent secondary infection.  Voiced understanding.  Refill on topical medication, use as needed and as directed by primary MD  Elevated blood pressure in the clinic.  Monitor the blood pressure and maintain the log.  With persistent elevated numbers may need further evaluation.  Call this clinic for any questions.    Laceration of skin of left forearm, initial encounter    Rash, skin  -     ketoconazole (NIZORAL) 2 % cream; Apply topically once daily.  Dispense: 15 g; Refill: 0    Elevated BP without diagnosis of hypertension

## 2023-09-26 NOTE — PATIENT INSTRUCTIONS
Discussed in detail on physical findings, repair with Steri-Strips.  Keep the area dry and clean.  Do not soak in water like bathtub or swimming pool.  Tylenol or ibuprofen for pain and discomfort.  Considering the rusted nail antibiotics to prevent secondary infection.  Voiced understanding.  Refill on topical medication, use as needed and as directed by primary MD  Elevated blood pressure in the clinic.  Monitor the blood pressure and maintain the log.  With persistent elevated numbers may need further evaluation.  Call this clinic for any questions.